# Patient Record
(demographics unavailable — no encounter records)

---

## 2024-11-08 NOTE — PLAN
[FreeTextEntry1] : BW drawn today Pt already received flu vaccine Encourage 150 minutes of physical activity a week Encouraged well balanced diet, low carbohydrates/fatty foods follow up 1 year or sooner if needed

## 2024-11-08 NOTE — HISTORY OF PRESENT ILLNESS
[FreeTextEntry1] : CPE [de-identified] : 36yo F presents for CPE. Pt reports taking Adderall 20mg ER, xanax PRN, Orsythia 1-20mg, and trazodone as needed. Pt reports no acute complaints today. Pt is exercising and eating well. Pt reports she does not sleep well and feels like she only wakes up at night which she contributes to her MS. Pt reports waking up tired but denies snoring.    Neuro: Dr Hutchinson @ [facility]  OBGYN: Dr. Prajapati  Dermatology: [facility] removed spot on foot, but now sees Dr. Maribell Miner  Pt denies any CP, palpitations, SOB, KAT, fevers, chills, abdominal pain, N/V, diarrhea, constipation, BRBPR or dark tarry stools.

## 2024-11-08 NOTE — HEALTH RISK ASSESSMENT
[Good] : ~his/her~  mood as  good [Yes] : Yes [Monthly or less (1 pt)] : Monthly or less (1 point) [1 or 2 (0 pts)] : 1 or 2 (0 points) [Never (0 pts)] : Never (0 points) [No] : In the past 12 months have you used drugs other than those required for medical reasons? No [1] : 1) Little interest or pleasure doing things for several days (1) [0] : 2) Feeling down, depressed, or hopeless: Not at all (0) [PHQ-2 Negative - No further assessment needed] : PHQ-2 Negative - No further assessment needed [I have developed a follow-up plan documented below in the note.] : I have developed a follow-up plan documented below in the note. [Former] : Former [None] : None [Alone] : lives alone [Employed] : employed [Single] : single [Feels Safe at Home] : Feels safe at home [Fully functional (bathing, dressing, toileting, transferring, walking, feeding)] : Fully functional (bathing, dressing, toileting, transferring, walking, feeding) [Fully functional (using the telephone, shopping, preparing meals, housekeeping, doing laundry, using] : Fully functional and needs no help or supervision to perform IADLs (using the telephone, shopping, preparing meals, housekeeping, doing laundry, using transportation, managing medications and managing finances) [Audit-CScore] : 1 [[id number]] : 1 [Sexually Active] : not sexually active [FreeTextEntry2] : [employer]

## 2024-11-08 NOTE — HEALTH RISK ASSESSMENT
[Good] : ~his/her~  mood as  good [Yes] : Yes [Monthly or less (1 pt)] : Monthly or less (1 point) [1 or 2 (0 pts)] : 1 or 2 (0 points) [Never (0 pts)] : Never (0 points) [No] : In the past 12 months have you used drugs other than those required for medical reasons? No [1] : 1) Little interest or pleasure doing things for several days (1) [0] : 2) Feeling down, depressed, or hopeless: Not at all (0) [PHQ-2 Negative - No further assessment needed] : PHQ-2 Negative - No further assessment needed [I have developed a follow-up plan documented below in the note.] : I have developed a follow-up plan documented below in the note. [Former] : Former [None] : None [Alone] : lives alone [Employed] : employed [Single] : single [Feels Safe at Home] : Feels safe at home [Fully functional (bathing, dressing, toileting, transferring, walking, feeding)] : Fully functional (bathing, dressing, toileting, transferring, walking, feeding) [Fully functional (using the telephone, shopping, preparing meals, housekeeping, doing laundry, using] : Fully functional and needs no help or supervision to perform IADLs (using the telephone, shopping, preparing meals, housekeeping, doing laundry, using transportation, managing medications and managing finances) [Audit-CScore] : 1 [[id number]] : 1 [Sexually Active] : not sexually active [FreeTextEntry2] : [employer]

## 2024-11-08 NOTE — HISTORY OF PRESENT ILLNESS
[FreeTextEntry1] : CPE [de-identified] : 34yo F presents for CPE. Pt reports taking Adderall 20mg ER, xanax PRN, Orsythia 1-20mg, and trazodone as needed. Pt reports no acute complaints today. Pt is exercising and eating well. Pt reports she does not sleep well and feels like she only wakes up at night which she contributes to her MS. Pt reports waking up tired but denies snoring.    Neuro: Dr Hutchinson @ [facility]  OBGYN: Dr. Prajapati  Dermatology: [facility] removed spot on foot, but now sees Dr. Maribell Miner  Pt denies any CP, palpitations, SOB, KAT, fevers, chills, abdominal pain, N/V, diarrhea, constipation, BRBPR or dark tarry stools.

## 2024-11-11 NOTE — HISTORY OF PRESENT ILLNESS
[FreeTextEntry1] : Reason for consult: MS  HPI: QUIQUE PAULSON is a 35 year old woman   Seen by Dr. Iglesias 2010 - trouble bending knees, then developed abrupt onset R eye visual loss but took time to see a doctor. Had brain lesions. Reportedly have normal cord. Saw Dr. Landry who was concerned for lyme dz because had a rash but titers were negative, did abx rx, then developed "MS hug" and tremors. Had CSF with +OCBs. Had another episode of optic neuritis, got high dose oral prednisone.  2011 - started gilenya x2y but having lots of infections, UTI and skin infections.  Swtiched to tecfidera x6m but losing a lot of hair.  Eventually tysabri since ~2015 (almost a decade).  ROS/Current Sx: urinary urgency - chronic since childhood diarrhea, nausea, vomiting - intolerance to meat fatigue  PMHX: MS  MEDS: trazodone 100mg prn sleep probiotics tysabri adderral 1/2 of the 20mg XR B12 injections iron infusions flonase OCP  ALL:  nkda  SHx: no tob, occ etoh, recreational marijuana  FHx: no neuro, no MS   Vitals: unremarkable   Exam:  AO3.  Normally conversant.  Follows commands, names, and repeats.  Good attention.  PERRL, VFF, EOMI, no nystagmus, face symmetric, TUP at midline.  Motor:                                                   R:                               L: Del                                           5                                5 Bi                                              5                               5 Tri                                            5                               5 Wrist Extensors                      5                               5 Finger abductors                    5                               5                                         5                               5   HF                                           5                               5 KE                                           5                               5 KF                                           5                               5 DF                                           5                               5 PF                                           5                               5  Tone                                       R                               L UE                                          0                                0  LE                                          0                                0  Sensory                                RUE                      LUE                 RLE                LLE      LT                                           +                            +                      +                   + Vib                                          +                            +                      +                   + JPS                                         +                            +                      +                   + PP                                         +                            +                      +                   + Temp                                     +                            +                      +                   +  Reflexes:                                              R                             L                             Biceps                                  2                             2 BR                                        2                             2 Triceps                                2                              2 Pat                                        2                            2  AJ                                        2                             2  TOES                                    F                            F  Coordination:                                              R                             L                        FTN                                       0                             0  MENDY                                      0                            0 HTS                                      0                             0   Other                                                                             Gait: normal, can heel, toe, and tandem                      Assistance: none   MRI brain 4/2024 and 8/2023 - no change, mostly PV, no PF or definite LINCOLN. mild burden, highly typical appearance.    AP: 36yo w/ likely RRMS (reportedly several episodes of ON, CSF with +OCBs per report, and MRI with multiple PV lesions). May not quite fully meet criteria based on available imaging but no doubt MS.    all questions answered, education provided, management discussed at length,  - transition to tysabri at 611 or specialty infusion. - repeat MRI in 4/2025 - lab work q6m including JCV - RTC 6m sooner prn